# Patient Record
Sex: FEMALE | Race: WHITE | URBAN - METROPOLITAN AREA
[De-identification: names, ages, dates, MRNs, and addresses within clinical notes are randomized per-mention and may not be internally consistent; named-entity substitution may affect disease eponyms.]

---

## 2017-01-13 ENCOUNTER — OFFICE VISIT (OUTPATIENT)
Dept: OTOLARYNGOLOGY | Facility: CLINIC | Age: 37
End: 2017-01-13
Payer: COMMERCIAL

## 2017-01-13 DIAGNOSIS — Z98.890 S/P TYMPANOPLASTY: Primary | ICD-10-CM

## 2017-01-13 PROCEDURE — 99024 POSTOP FOLLOW-UP VISIT: CPT | Performed by: OTOLARYNGOLOGY

## 2017-01-13 ASSESSMENT — ENCOUNTER SYMPTOMS
EYES NEGATIVE: 1
GASTROINTESTINAL NEGATIVE: 1
NEUROLOGICAL NEGATIVE: 1
CONSTITUTIONAL NEGATIVE: 1

## 2017-01-13 ASSESSMENT — PAIN SCALES - GENERAL: PAINLEVEL: MODERATE PAIN (4)

## 2017-01-13 NOTE — PROGRESS NOTES
HPI      S/p Tympanoplasty. She does great with no ear drainage, pain.     Review of Systems   Constitutional: Negative.    Eyes: Negative.    Gastrointestinal: Negative.    Skin: Negative.    Neurological: Negative.          Physical Exam   Constitutional: She is well-developed, well-nourished, and in no distress.   HENT:   Head: Normocephalic and atraumatic.   Right Ear: External ear normal.   Left Ear: External ear normal.   Nose: Nose normal.   Mouth/Throat: Oropharynx is clear and moist. No oropharyngeal exudate.   Eyes: Pupils are equal, round, and reactive to light.   Neck: Neck supple. No thyromegaly present.       Fascia is intact. No infection. F/u in a month.

## 2017-01-13 NOTE — MR AVS SNAPSHOT
After Visit Summary   2017    Neetu Nick    MRN: [id number]           Patient Information     Date Of Birth          1980        Visit Information        Provider Department      2017 10:30 AM Bran Schafer MD [facility]         Follow-ups after your visit        Your next 10 appointments already scheduled     Feb 10, 2017 10:30 AM   Return Visit with Bran Schafer MD   [facility] ([facility])    [address] [place] [address] [phone]              Who to contact     If you have questions or need follow up information about today's clinic visit or your schedule please contact [facility] directly at [phone].  Normal or non-critical lab and imaging results will be communicated to you by MyChart, letter or phone within 4 business days after the clinic has received the results. If you do not hear from us within 7 days, please contact the clinic through MyChart or phone. If you have a critical or abnormal lab result, we will notify you by phone as soon as possible.  Submit refill requests through [employer] or call your pharmacy and they will forward the refill request to us. Please allow 3 business days for your refill to be completed.          Additional Information About Your Visit        MyChart Information     [employer] is an electronic gateway that provides easy, online access to your medical records. With [employer], you can request a clinic appointment, read your test results, renew a prescription or communicate with your care team.     To sign up for [employer] visit the website at [email or web address]   You will be asked to enter the access code listed below, as well as some personal information. Please follow the directions to create your username and password.     Your access code is: [email or web address]  Expires: 2017 11:35 AM     Your access code will  in 90  days. If you need help or a new code, please contact your [facility] or call [phone] for assistance.        Care EveryWhere ID     This is your Care EveryWhere ID. This could be used by other organizations to access your [facility] medical records  [id number]         Blood Pressure from Last 3 Encounters:   12/13/16 111/75   06/26/11 109/70    Weight from Last 3 Encounters:   12/06/16 55.792 kg (123 lb)   06/21/11 70.625 kg (155 lb 11.2 oz)              Today, you had the following     No orders found for display       Primary Care Provider Office Phone # Fax #    [facility] [phone]       [address] [place] [address]        Thank you!     Thank you for choosing [facility]  for your care. Our goal is always to provide you with excellent care. Hearing back from our patients is one way we can continue to improve our services. Please take a few minutes to complete the written survey that you may receive in the mail after your visit with us. Thank you!             Your Updated Medication List - Protect others around you: Learn how to safely use, store and throw away your medicines at www.disposemymeds.org.          This list is accurate as of: 1/13/17 11:36 AM.  Always use your most recent med list.                   Brand Name Dispense Instructions for use    Evening Primrose Oil          fluticasone 50 MCG/ACT spray    FLONASE    3 Bottle    Spray 1-2 sprays into both nostrils daily       ibuprofen 600 MG tablet    ADVIL/MOTRIN    30 tablet    Take 1 tablet by mouth every 6 hours as needed.       OMEGA-3 FISH OIL PO      Take by mouth daily       VITAMIN B 12 PO          VITAMIN C PO      Take 1,000 mg by mouth

## 2017-01-13 NOTE — NURSING NOTE
"Neetu Nick's goals for this visit include:   Chief Complaint   Patient presents with     RECHECK     12-13-16       She requests these members of her care team be copied on today's visit information: Nikos, [facility]      PCP: Nikos [facility]    Referring Provider:  ESTABLISHED PATIENT  No address on file    Chief Complaint   Patient presents with     RECHECK     12-13-16       Initial There were no vitals taken for this visit. Estimated body mass index is 21.10 kg/(m^2) as calculated from the following:    Height as of 12/6/16: 1.626 m (5' 4\").    Weight as of 12/13/16: 55.792 kg (123 lb).  BP completed using cuff size: NA (Not Taken)    "

## 2017-01-20 ENCOUNTER — TELEPHONE (OUTPATIENT)
Dept: OTOLARYNGOLOGY | Facility: CLINIC | Age: 37
End: 2017-01-20

## 2017-01-20 NOTE — TELEPHONE ENCOUNTER
[facility] Call Center    Phone Message    Name of Caller: Neetu    Phone Number: Home number on file [phone] (home)    Best time to return call: ANY    May a detailed message be left on voicemail: yes    Relation to patient: Self    Reason for Call: Other: Patient wanted to call because she has ahd a headache for four days. wanted to update clinic. please call back and advise     Action Taken: Message routed to:  Adult Clinics: ENT p [phone]

## 2017-01-23 NOTE — TELEPHONE ENCOUNTER
Phone call received from pt stating symptoms as listed below.  Upon review with Dr Schafer, unlikely to be ear related, as pt was seen last week and ear looked healthy at that time. No new ear symptoms reported.  Pt states she will monitor condition and contact primary care physician as necessary.  Radha Westbrook RN

## 2017-02-10 ENCOUNTER — OFFICE VISIT (OUTPATIENT)
Dept: OTOLARYNGOLOGY | Facility: CLINIC | Age: 37
End: 2017-02-10
Payer: COMMERCIAL

## 2017-02-10 VITALS
SYSTOLIC BLOOD PRESSURE: 103 MMHG | DIASTOLIC BLOOD PRESSURE: 68 MMHG | WEIGHT: 123 LBS | HEIGHT: 64 IN | BODY MASS INDEX: 21 KG/M2 | HEART RATE: 83 BPM

## 2017-02-10 DIAGNOSIS — Z09 S/P EAR SURGERY, FOLLOW-UP EXAM: Primary | ICD-10-CM

## 2017-02-10 PROCEDURE — 99024 POSTOP FOLLOW-UP VISIT: CPT | Performed by: OTOLARYNGOLOGY

## 2017-02-10 ASSESSMENT — ENCOUNTER SYMPTOMS
CONSTITUTIONAL NEGATIVE: 1
TINGLING: 0
HEARTBURN: 0
TREMORS: 0
NAUSEA: 0
VOMITING: 0
BRUISES/BLEEDS EASILY: 0
DOUBLE VISION: 0
DIZZINESS: 0
COUGH: 0
BLURRED VISION: 0

## 2017-02-10 NOTE — NURSING NOTE
"Neetu Nick's goals for this visit include:   Chief Complaint   Patient presents with     RECHECK     following up on ear surgery       She requests these members of her care team be copied on today's visit information: Yes    PCP: Nikos, [facility]    Referring Provider:  No referring provider defined for this encounter.    Chief Complaint   Patient presents with     RECHECK     following up on ear surgery       Initial /68 mmHg  Pulse 83 Estimated body mass index is 21.10 kg/(m^2) as calculated from the following:    Height as of 12/6/16: 1.626 m (5' 4\").    Weight as of 12/13/16: 55.792 kg (123 lb).  Medication Reconciliation: complete    "

## 2017-02-10 NOTE — PROGRESS NOTES
HPI    This pleasant patient is here for the f/u. I am glad to see that she states improvement in her hearing. Denies any fever, ear drainage, pain, or dizziness.    Review of Systems   Constitutional: Negative.    HENT: Negative.    Eyes: Negative for blurred vision and double vision.   Respiratory: Negative for cough.    Gastrointestinal: Negative for heartburn, nausea and vomiting.   Skin: Negative.    Neurological: Negative for dizziness, tingling and tremors.   Endo/Heme/Allergies: Negative for environmental allergies. Does not bruise/bleed easily.         Physical Exam   Constitutional: She is well-developed, well-nourished, and in no distress.   HENT:   Head: Normocephalic and atraumatic.   Right Ear: No drainage, swelling or tenderness. No middle ear effusion. Decreased hearing is noted.   Left Ear: External ear and ear canal normal. No drainage, swelling or tenderness. Tympanic membrane is scarred.  No middle ear effusion. Decreased hearing is noted.   Nose: Nose normal.   Mouth/Throat: Uvula is midline, oropharynx is clear and moist and mucous membranes are normal. No oropharyngeal exudate.   Eyes: Pupils are equal, round, and reactive to light.   Neck: Neck supple. No thyromegaly present.   Lymphadenopathy:     She has no cervical adenopathy.     Right ear canal was evaluated under the microscope. There is some scar tissues at the endaural incision site blocking the ear canal. No secretion. Ear drum can partially be seen.  Rinne: WNLs.    A/P  A meatoplasty, ear canal reconstruction with scar tissue removal should be planned. Pros and cons of the procedure explained. She will be scheduled for the surgery. Her questions were answered.

## 2017-02-13 ENCOUNTER — TELEPHONE (OUTPATIENT)
Dept: OTOLARYNGOLOGY | Facility: CLINIC | Age: 37
End: 2017-02-13

## 2017-02-13 NOTE — TELEPHONE ENCOUNTER
"Procedure: right Meatoplasty  Facility: [facility]  Length: 2.0 hour(s)  Surgeon:Bran Schafer MD  Anesthesia: General  Diagnosis: Right narrow ear canal & Scar tissue  Out Patient or AM admit:  (Same day)  BMI:Estimated body mass index is 21.11 kg/(m^2) as calculated from the following:    Height as of 2/10/17: 1.626 m (5' 4\").    Weight as of 2/10/17: 55.8 kg (123 lb). (If over 43 for general or 45 for MAC cannot be scheduled at [facility])   Pre-op Appointments needed: History & Physical within 30 days of surgery  Post-op appointments needed: Right narrow ear canal & Scar tissue  1 week   needed:No   Surgery packet/instructions given to patient?:  No  Pre op teaching done:  No  Lens Orders Needed: No   Referring provider:   Special Considerations:           "

## 2017-02-13 NOTE — TELEPHONE ENCOUNTER
I left a message for the patient to call back to get the surgery scheduled.  Carolina Novak, Surgery Scheduling Coordinator

## 2017-02-14 NOTE — TELEPHONE ENCOUNTER
I spoke with the patient and gave her all of the surgery dates for April. She is going to check her her  and call me back tomorrow.  Carolina Novak, Surgery Scheduling Coordinator

## 2017-02-20 ENCOUNTER — HOSPITAL ENCOUNTER (OUTPATIENT)
Facility: AMBULATORY SURGERY CENTER | Age: 37
End: 2017-02-20
Attending: OTOLARYNGOLOGY | Admitting: OTOLARYNGOLOGY
Payer: COMMERCIAL

## 2017-02-22 NOTE — TELEPHONE ENCOUNTER
Date Scheduled: 4-7-17 at 7:30am  Facility: [facility]  Surgeon: Dr. Schafer   Post-op appointment scheduled:    scheduled?: No  Surgery packet/instructions confirmed received?  Yes, mailed  Special Considerations:   Carolina Novak, Surgery Scheduling Coordinator

## 2017-03-13 ENCOUNTER — PRE VISIT (OUTPATIENT)
Dept: OTOLARYNGOLOGY | Facility: CLINIC | Age: 37
End: 2017-03-13

## 2017-03-13 NOTE — TELEPHONE ENCOUNTER
Spoke with patient regarding her visits.  Dr. Cheney said he wants to see her before her surgery 4-7. She has an appointment with him on 3-24 and a pre-op scheduled with her provider.  March 13, 2017              Petty Hinson     No chief complaint on file.      Pre-Visit Documentation: Neetu Nick is a 37 year old female      Current Outpatient Prescriptions   Medication Sig Dispense Refill     Ascorbic Acid (VITAMIN C PO) Take 1,000 mg by mouth       Omega-3 Fatty Acids (OMEGA-3 FISH OIL PO) Take by mouth daily       Cyanocobalamin (VITAMIN B 12 PO)        Evening Primrose OIL        fluticasone (FLONASE) 50 MCG/ACT nasal spray Spray 1-2 sprays into both nostrils daily 3 Bottle 1     ibuprofen (ADVIL,MOTRIN) 600 MG tablet Take 1 tablet by mouth every 6 hours as needed. 30 tablet 0       ASSESSMENT / PLAN:  No diagnosis found.

## 2017-03-24 ENCOUNTER — OFFICE VISIT (OUTPATIENT)
Dept: OTOLARYNGOLOGY | Facility: CLINIC | Age: 37
End: 2017-03-24
Payer: COMMERCIAL

## 2017-03-24 VITALS
HEIGHT: 64 IN | DIASTOLIC BLOOD PRESSURE: 60 MMHG | HEART RATE: 78 BPM | BODY MASS INDEX: 21.51 KG/M2 | WEIGHT: 126 LBS | SYSTOLIC BLOOD PRESSURE: 110 MMHG

## 2017-03-24 DIAGNOSIS — H90.0 CONDUCTIVE HEARING LOSS, BILATERAL: Primary | ICD-10-CM

## 2017-03-24 PROCEDURE — 99212 OFFICE O/P EST SF 10 MIN: CPT | Mod: 25 | Performed by: OTOLARYNGOLOGY

## 2017-03-24 PROCEDURE — 92504 EAR MICROSCOPY EXAMINATION: CPT | Performed by: OTOLARYNGOLOGY

## 2017-03-24 NOTE — NURSING NOTE
"Neetu Nick's goals for this visit include:   Chief Complaint   Patient presents with     RECHECK     Surgery on 4-7       She requests these members of her care team be copied on today's visit information: yes      PCP: Nikos, [facility]    Referring Provider:  ESTABLISHED PATIENT  No address on file    Chief Complaint   Patient presents with     RECHECK     Surgery on 4-7       Initial /60  Pulse 78  Ht 1.626 m (5' 4\")  Wt 57.2 kg (126 lb)  BMI 21.63 kg/m2 Estimated body mass index is 21.63 kg/(m^2) as calculated from the following:    Height as of this encounter: 1.626 m (5' 4\").    Weight as of this encounter: 57.2 kg (126 lb).  Medication Reconciliation: complete    "

## 2017-03-24 NOTE — PROGRESS NOTES
HPI    This pleasant patient is here for the f/u. I am glad to see that she states improvement in her hearing. Denies any fever, ear drainage, or pain. She has had an episode of vertigo recently. Lasted less than a minute. Associated with neck movement. Denies any ear pressure, vomiting, ear drainage. Described dizziness for the past 5 years. Occurred twice a week.     Review of Systems   Constitutional: Negative.    HENT: Negative.    Eyes: Negative for blurred vision and double vision.   Respiratory: Negative for cough.    Gastrointestinal: Negative for heartburn, nausea and vomiting.   Skin: Negative.    Neurological: Negative for dizziness, tingling and tremors.   Endo/Heme/Allergies: Negative for environmental allergies. Does not bruise/bleed easily.         Physical Exam   Constitutional: She is well-developed, well-nourished, and in no distress.   HENT:   Head: Normocephalic and atraumatic.   Right Ear: No drainage, swelling or tenderness. No middle ear effusion. Decreased hearing is noted.   Left Ear: External ear and ear canal normal. No drainage, swelling or tenderness. Tympanic membrane is scarred.  No middle ear effusion. Decreased hearing is noted.   Nose: Nose normal.   Mouth/Throat: Uvula is midline, oropharynx is clear and moist and mucous membranes are normal. No oropharyngeal exudate.   Eyes: Pupils are equal, round, and reactive to light.   Neck: Neck supple. No thyromegaly present.   Lymphadenopathy:     She has no cervical adenopathy.     Right ear canal was evaluated under the microscope. Right anterior perforation. No secretion.   Rinne: WNLs.    A/P  I will request a hearing test and have her see vestibular rehab for Amber Hallpike. Her questions were answered.

## 2017-03-24 NOTE — MR AVS SNAPSHOT
After Visit Summary   3/24/2017    Neetu Nick    MRN: [id number]           Patient Information     Date Of Birth          1980        Visit Information        Provider Department      3/24/2017 10:30 AM Bran Schafer MD [facility]        Today's Diagnoses     Conductive hearing loss, bilateral    -  1       Follow-ups after your visit        Your next 10 appointments already scheduled     Apr 07, 2017   Procedure with Bran Schafer MD   [place] (--)    [address] [facility] [address] [phone]              Who to contact     If you have questions or need follow up information about today's clinic visit or your schedule please contact [facility] directly at [phone].  Normal or non-critical lab and imaging results will be communicated to you by MyChart, letter or phone within 4 business days after the clinic has received the results. If you do not hear from us within 7 days, please contact the clinic through MyChart or phone. If you have a critical or abnormal lab result, we will notify you by phone as soon as possible.  Submit refill requests through [employer] or call your pharmacy and they will forward the refill request to us. Please allow 3 business days for your refill to be completed.          Additional Information About Your Visit        [employer] Information     [employer] is an electronic gateway that provides easy, online access to your medical records. With [employer], you can request a clinic appointment, read your test results, renew a prescription or communicate with your care team.     To sign up for [employer] visit the website at [email or web address]   You will be asked to enter the access code listed below, as well as some personal information. Please follow the directions to create your username and password.     Your access code is: [email or web address]  Expires: 6/22/2017 10:55 AM     Your  "access code will  in 90 days. If you need help or a new code, please contact your [facility] or call [phone] for assistance.        Care EveryWhere ID     This is your Care EveryWhere ID. This could be used by other organizations to access your [facility] medical records  [id number]        Your Vitals Were     Pulse Height BMI (Body Mass Index)             78 1.626 m (5' 4\") 21.63 kg/m2          Blood Pressure from Last 3 Encounters:   17 110/60   02/10/17 103/68   16 111/75    Weight from Last 3 Encounters:   17 57.2 kg (126 lb)   02/10/17 55.8 kg (123 lb)   16 55.8 kg (123 lb)              Today, you had the following     No orders found for display       Primary Care Provider Office Phone # Fax #    [facility] [phone]       [address] [place] [address]        Thank you!     Thank you for choosing [facility]  for your care. Our goal is always to provide you with excellent care. Hearing back from our patients is one way we can continue to improve our services. Please take a few minutes to complete the written survey that you may receive in the mail after your visit with us. Thank you!             Your Updated Medication List - Protect others around you: Learn how to safely use, store and throw away your medicines at www.disposemymeds.org.          This list is accurate as of: 3/24/17 10:55 AM.  Always use your most recent med list.                   Brand Name Dispense Instructions for use    B COMPLEX PO          CAPSICUM (CAYENNE) PO          DAILY VITAMINS FOR WOMEN PO          Evening Primrose Oil          fluticasone 50 MCG/ACT spray    FLONASE    3 Bottle    Spray 1-2 sprays into both nostrils daily       ibuprofen 600 MG tablet    ADVIL/MOTRIN    30 tablet    Take 1 tablet by mouth every 6 hours as needed.       OMEGA-3 FISH OIL PO      Take by mouth daily       VITAMIN B 12 PO          " VITAMIN C PO      Take 1,000 mg by mouth       ZITHROMAX PO      Take 500 mg by mouth

## 2017-04-17 ENCOUNTER — OFFICE VISIT (OUTPATIENT)
Dept: AUDIOLOGY | Facility: CLINIC | Age: 37
End: 2017-04-17
Payer: COMMERCIAL

## 2017-04-17 ENCOUNTER — HOSPITAL ENCOUNTER (OUTPATIENT)
Dept: PHYSICAL THERAPY | Facility: CLINIC | Age: 37
Setting detail: THERAPIES SERIES
End: 2017-04-17
Attending: OTOLARYNGOLOGY
Payer: COMMERCIAL

## 2017-04-17 DIAGNOSIS — H90.3 BILATERAL SENSORINEURAL HEARING LOSS: Primary | ICD-10-CM

## 2017-04-17 PROCEDURE — 92567 TYMPANOMETRY: CPT | Performed by: AUDIOLOGIST

## 2017-04-17 PROCEDURE — 97112 NEUROMUSCULAR REEDUCATION: CPT | Mod: GP

## 2017-04-17 PROCEDURE — 97161 PT EVAL LOW COMPLEX 20 MIN: CPT | Mod: GP

## 2017-04-17 PROCEDURE — 40000840 ZZHC STATISTIC PT VESTIBULAR VISIT

## 2017-04-17 PROCEDURE — 92557 COMPREHENSIVE HEARING TEST: CPT | Performed by: AUDIOLOGIST

## 2017-04-17 NOTE — PROGRESS NOTES
04/17/17 1000   Quick Adds   Quick Adds Vestibular Eval   Type of Visit Initial OP PT Evaluation   General Information   Start of Care Date 04/17/17   Referring Physician Bran Schafer MD   Orders Evaluate and Treat as Indicated   Order Date 03/24/17   Medical Diagnosis vesibular, healing loss   Onset of illness/injury or Date of Surgery (5+ years)   Surgical/Medical history reviewed Yes   Pertinent history of current vestibular problem (include personal factors and/or comorbidities that impact the POC)  Migraines;Hearing loss   Pertinent history of current problem (include personal factors and/or comorbidities that impact the POC) Had R ear surgery in 12/13/2016 for  Right canaloplasty+ right Tympanoplasty, . Did well after surgery- feels like it might be helping in terms of hearing. Per pt has had multiple ear surgeries on both sides, but more frequently on right. History of migraines. The dizziness/vertigo has been going on for ~ 5 years. Began 5 years ago when she was pregnant. Episode began at school and actually had a fall into the locker it was so bad. No other falls and only maybe a little off balance during the episodes. The vertigo is short duration (~ 1 min) but she will get several episodes over a 1-2 day period at a time and then it will go away for a few weeks or months even.  She does describe vertigo as inconsistent- often correlated with head movements but head movements do not always cause. She has tinnutis in B ears- but not sure if this correlates at all. Most recent bout of vertigo was ~2 weeks ago. Last a few days and again was very intermittent. Does not believe she has had any episodes in last week or 2.    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    Diagnostic Tests Audiogram   Audiogram Results Results   Audiogram Results WNL to mild loss B (see notes for full details)    Previous/Current Treatment Physical Therapy   Improvement after PT No  (Saw Pt in dec, but wasn't having a spell so did not do tx)   Current Community Support Family/friend caregiver   Home/Community Accessibility Comments Lives with  and daughter (5 year old).   Patient/Family Goals Statement Find out cause of the vertigo; get rid of the vertigo   Fall Risk Screen   Fall screen completed by PT   Per patient - Fall 2 or more times in past year? No   Per patient - Fall with injury in past year? No   Is patient a fall risk? No   Functional Scales   Functional Scales and Outcomes DHI 14/100   Cognitive Status Examination   Orientation orientation to person, place and time   Level of Consciousness alert   Follows Commands and Answers Questions 100% of the time   Personal Safety and Judgment intact   Memory intact   Posture   Posture Normal   Range of Motion (ROM)   ROM Comment WNL   Strength   Strength Comments WFL for transfers and gait;    Musculoskeletal Special Tests   Musculoskeletal Special Tests    Bed Mobility   Bed Mobility Comments No vertigo provoked during today's exam- see below for full details   Gait   Gait Comments Amb into clinic indep; pt demo's good henry and has no apparent gait deficits.   Gait Special Tests   Gait Special Tests FUNCTIONAL GAIT ASSESSMENT   Gait Special Tests Functional Gait Assessment Score out of 30   Score out of 30 30   Balance Special Tests   Balance Special Tests Modified CTSIB Conditions   Balance Special Tests Modified CTSIB Conditions   Condition 1, seconds 30 Seconds   Condition 2, seconds 30 Seconds   Condition 4, seconds 30 Seconds   Condition 5, seconds 30 Seconds   Cervicogenic Screen   Vertebral Artery Test Normal   Vertebral Artery Test Comments Able to perform cervical rotation and extension without any sx.   Neck ROM WNL   Oculomotor Exam   Smooth Pursuit Normal   Saccades Normal   VOR Normal   Infrared Goggle Exam or Frenzel Lense Exam   Vestibular  Suppressant in Last 24 Hours? No   Exam completed with Infrared Goggles   Spontaneous Nystagmus Negative   Gaze Evoked Nystagmus Negative   Head Shake Horizontal Nystagmus Negative   Amber-Hallpike (right) Negative   Amber-Hallpike (Left) Negative   Demotte-Hallpike (left) comments A brief second of dizziness with return to sit; no nystagmus and pt denied that this was the same feeling she gets at home   [facility] Supine Roll Test (Right) Negative   [facility] Supine Roll Test (Left) Negative   Planned Therapy Interventions   Planned Therapy Interventions neuromuscular re-education   Clinical Impression   Criteria for Skilled Therapeutic Interventions Met yes, treatment indicated  (eval and one treatment only unless sx come back in month)   PT Diagnosis vertigo   Influenced by the following impairments subjective reports of vertigo/dizziness over last 5 years; often assosciated with head movements.   Functional limitations due to impairments decreased quality of life   Clinical Presentation Stable/Uncomplicated   Clinical Decision Making (Complexity) Low complexity   Predicted Duration of Therapy Intervention (days/wks) 1-2 visits as needed if acute onset of vertigo reoccurs or following further vestibular testing   Risk & Benefits of therapy have been explained Yes   Patient, Family & other staff in agreement with plan of care Yes   Clinical Impression Comments Pt presented to PT today with good dynamic balance and gait. No vertigo or dizziness and has not had any last 1-2 weeks. Oculomotor testing and positional testing all negative. As PT unable to reproduce sx, no further PT intervention indicated. Edu to return to PT during acute onsent of symptoms if reoccur in next month. ALso edu on further vesitbular testing through VNG and rotatory chair which could be done at [facility]. Would f/u with referring physician on this to get order if indicated. At this time appears vertigo could be Migraine associated  vertigo vs Secondary endolymphatic hydrops?   GOALS   PT Eval Goals 1   Goal 1   Goal Identifier DHI   Goal Description Neetu will demonstrate a 6 pt improvement or more (~50%) to demo improve quality of life and control of vertigo sx.    Target Date 06/15/17   Total Evaluation Time   Total Evaluation Time (Minutes) 35

## 2017-04-17 NOTE — MR AVS SNAPSHOT
After Visit Summary   4/17/2017    Neetu Nick    MRN: [id number]           Patient Information     Date Of Birth          1980        Visit Information        Provider Department      4/17/2017 10:00 AM Enrico Bee AuD [facility]        Today's Diagnoses     Bilateral sensorineural hearing loss    -  1       Follow-ups after your visit        Your next 10 appointments already scheduled     Apr 17, 2017 11:00 AM CDT   Evaluation with Ana Tsang, PT   [facility] ([facility])    [address] [facility] [address] [phone]              Who to contact     If you have questions or need follow up information about today's clinic visit or your schedule please contact [facility] directly at [phone].  Normal or non-critical lab and imaging results will be communicated to you by MyChart, letter or phone within 4 business days after the clinic has received the results. If you do not hear from us within 7 days, please contact the clinic through MyChart or phone. If you have a critical or abnormal lab result, we will notify you by phone as soon as possible.  Submit refill requests through [employer] or call your pharmacy and they will forward the refill request to us. Please allow 3 business days for your refill to be completed.          Additional Information About Your Visit        MDSavehart Information     App[employer] is an electronic gateway that provides easy, online access to your medical records. With [employer], you can request a clinic appointment, read your test results, renew a prescription or communicate with your care team.     To sign up for [employer] visit the website at [email or web address]   You will be asked to enter the access code listed below, as well as some personal information. Please follow the directions to create your username and password.     Your access code is:  [email or web address]  Expires: 2017 10:55 AM     Your access code will  in 90 days. If you need help or a new code, please contact your [facility] or call [phone] for assistance.        Care EveryWhere ID     This is your Care EveryWhere ID. This could be used by other organizations to access your [place] medical records  [id number]         Blood Pressure from Last 3 Encounters:   17 110/60   02/10/17 103/68   16 111/75    Weight from Last 3 Encounters:   17 126 lb (57.2 kg)   02/10/17 123 lb (55.8 kg)   16 123 lb (55.8 kg)              We Performed the Following     AUDIOGRAM/TYMPANOGRAM - INTERFACE     COMPREHENSIVE HEARING TEST     TYMPANOMETRY        Primary Care Provider Office Phone # Fax #    [facility] [phone]       [address] [place] [id number]        Thank you!     Thank you for choosing [facility]  for your care. Our goal is always to provide you with excellent care. Hearing back from our patients is one way we can continue to improve our services. Please take a few minutes to complete the written survey that you may receive in the mail after your visit with us. Thank you!             Your Updated Medication List - Protect others around you: Learn how to safely use, store and throw away your medicines at www.disposemymeds.org.          This list is accurate as of: 17 10:23 AM.  Always use your most recent med list.                   Brand Name Dispense Instructions for use    B COMPLEX PO          CAPSICUM (CAYENNE) PO          DAILY VITAMINS FOR WOMEN PO          Evening Primrose Oil          fluticasone 50 MCG/ACT spray    FLONASE    3 Bottle    Spray 1-2 sprays into both nostrils daily       ibuprofen 600 MG tablet    ADVIL/MOTRIN    30 tablet    Take 1 tablet by mouth every 6 hours as needed.       OMEGA-3 FISH OIL PO      Take by mouth daily       VITAMIN B 12 PO           VITAMIN C PO      Take 1,000 mg by mouth

## 2017-04-17 NOTE — PROGRESS NOTES
AUDIOLOGY REPORT    SUMMARY: Audiology visit completed. See audiogram for results.    RECOMMENDATIONS: Follow-up with ENT.    Aaron Joiner  Doctor of Audiology  [place] License # [id number]